# Patient Record
Sex: MALE | Race: WHITE | Employment: FULL TIME | URBAN - METROPOLITAN AREA
[De-identification: names, ages, dates, MRNs, and addresses within clinical notes are randomized per-mention and may not be internally consistent; named-entity substitution may affect disease eponyms.]

---

## 2021-06-10 ENCOUNTER — ANCILLARY PROCEDURE (OUTPATIENT)
Dept: GENERAL RADIOLOGY | Facility: CLINIC | Age: 26
End: 2021-06-10
Attending: NURSE PRACTITIONER
Payer: COMMERCIAL

## 2021-06-10 ENCOUNTER — OFFICE VISIT (OUTPATIENT)
Dept: URGENT CARE | Facility: URGENT CARE | Age: 26
End: 2021-06-10
Payer: COMMERCIAL

## 2021-06-10 ENCOUNTER — NURSE TRIAGE (OUTPATIENT)
Dept: NURSING | Facility: CLINIC | Age: 26
End: 2021-06-10

## 2021-06-10 VITALS
TEMPERATURE: 98.7 F | RESPIRATION RATE: 16 BRPM | DIASTOLIC BLOOD PRESSURE: 84 MMHG | WEIGHT: 224.2 LBS | OXYGEN SATURATION: 97 % | SYSTOLIC BLOOD PRESSURE: 144 MMHG | HEART RATE: 86 BPM

## 2021-06-10 DIAGNOSIS — R10.31 RLQ ABDOMINAL PAIN: Primary | ICD-10-CM

## 2021-06-10 DIAGNOSIS — R03.0 ELEVATED BLOOD PRESSURE READING WITHOUT DIAGNOSIS OF HYPERTENSION: ICD-10-CM

## 2021-06-10 DIAGNOSIS — R10.11 RUQ ABDOMINAL PAIN: ICD-10-CM

## 2021-06-10 LAB
ALBUMIN UR-MCNC: NEGATIVE MG/DL
APPEARANCE UR: CLEAR
BACTERIA #/AREA URNS HPF: ABNORMAL /HPF
BASOPHILS # BLD AUTO: 0 10E9/L (ref 0–0.2)
BASOPHILS NFR BLD AUTO: 0.4 %
BILIRUB UR QL STRIP: NEGATIVE
COLOR UR AUTO: YELLOW
DIFFERENTIAL METHOD BLD: NORMAL
EOSINOPHIL # BLD AUTO: 0.1 10E9/L (ref 0–0.7)
EOSINOPHIL NFR BLD AUTO: 1.7 %
ERYTHROCYTE [DISTWIDTH] IN BLOOD BY AUTOMATED COUNT: 11.1 % (ref 10–15)
GLUCOSE UR STRIP-MCNC: NEGATIVE MG/DL
HCT VFR BLD AUTO: 49.1 % (ref 40–53)
HGB BLD-MCNC: 16.6 G/DL (ref 13.3–17.7)
HGB UR QL STRIP: ABNORMAL
KETONES UR STRIP-MCNC: NEGATIVE MG/DL
LEUKOCYTE ESTERASE UR QL STRIP: NEGATIVE
LYMPHOCYTES # BLD AUTO: 2.6 10E9/L (ref 0.8–5.3)
LYMPHOCYTES NFR BLD AUTO: 31.2 %
MCH RBC QN AUTO: 30.3 PG (ref 26.5–33)
MCHC RBC AUTO-ENTMCNC: 33.8 G/DL (ref 31.5–36.5)
MCV RBC AUTO: 90 FL (ref 78–100)
MONOCYTES # BLD AUTO: 1.1 10E9/L (ref 0–1.3)
MONOCYTES NFR BLD AUTO: 13.5 %
NEUTROPHILS # BLD AUTO: 4.4 10E9/L (ref 1.6–8.3)
NEUTROPHILS NFR BLD AUTO: 53.2 %
NITRATE UR QL: NEGATIVE
PH UR STRIP: 6 PH (ref 5–7)
PLATELET # BLD AUTO: 158 10E9/L (ref 150–450)
RBC # BLD AUTO: 5.47 10E12/L (ref 4.4–5.9)
RBC #/AREA URNS AUTO: ABNORMAL /HPF
SOURCE: ABNORMAL
SP GR UR STRIP: 1.02 (ref 1–1.03)
UROBILINOGEN UR STRIP-ACNC: 0.2 EU/DL (ref 0.2–1)
WBC # BLD AUTO: 8.3 10E9/L (ref 4–11)
WBC #/AREA URNS AUTO: ABNORMAL /HPF

## 2021-06-10 PROCEDURE — 99204 OFFICE O/P NEW MOD 45 MIN: CPT | Performed by: NURSE PRACTITIONER

## 2021-06-10 PROCEDURE — 81001 URINALYSIS AUTO W/SCOPE: CPT | Performed by: NURSE PRACTITIONER

## 2021-06-10 PROCEDURE — 74019 RADEX ABDOMEN 2 VIEWS: CPT | Performed by: RADIOLOGY

## 2021-06-10 PROCEDURE — 85025 COMPLETE CBC W/AUTO DIFF WBC: CPT | Performed by: NURSE PRACTITIONER

## 2021-06-10 PROCEDURE — 36415 COLL VENOUS BLD VENIPUNCTURE: CPT | Performed by: NURSE PRACTITIONER

## 2021-06-10 SDOH — HEALTH STABILITY: MENTAL HEALTH: HOW OFTEN DO YOU HAVE 6 OR MORE DRINKS ON ONE OCCASION?: NOT ASKED

## 2021-06-10 SDOH — HEALTH STABILITY: MENTAL HEALTH: HOW OFTEN DO YOU HAVE A DRINK CONTAINING ALCOHOL?: NOT ASKED

## 2021-06-10 SDOH — HEALTH STABILITY: MENTAL HEALTH: HOW MANY STANDARD DRINKS CONTAINING ALCOHOL DO YOU HAVE ON A TYPICAL DAY?: NOT ASKED

## 2021-06-10 NOTE — TELEPHONE ENCOUNTER
"S-(situation): patient has had lower right abdominal pain since Monday. He is also having really soft green stools. Noting the color bleeds out into the water. Notes stomach is bothering him.    B-(background): no surgical history    A-(assessment): see below    R-(recommendations): Urgent Care for assessment per protocol.    Roselia Mendez RN, [facility] Triage      Additional Information    Negative: Passed out (i.e., fainted, collapsed and was not responding)    Negative: Shock suspected (e.g., cold/pale/clammy skin, too weak to stand, low BP, rapid pulse)    Negative: Sounds like a life-threatening emergency to the triager    Negative: Chest pain    Negative: Pain is mainly in upper abdomen (if needed ask: 'is it mainly above the belly button?')    Negative: SEVERE abdominal pain (e.g., excruciating)    Negative: Vomiting red blood or black (coffee ground) material    Negative: Bloody, black, or tarry bowel movements (Exception: chronic-unchanged black-grey bowel movements and is taking iron pills or Pepto-bismol)    Negative: Unable to urinate (or only a few drops) and bladder feels very full    Negative: Pain in scrotum persists > 1 hour    Answer Assessment - Initial Assessment Questions  1. LOCATION: \"Where does it hurt?\"       Lower right side  2. RADIATION: \"Does the pain shoot anywhere else?\" (e.g., chest, back)      no  3. ONSET: \"When did the pain begin?\" (Minutes, hours or days ago)       Monday evening  4. SUDDEN: \"Gradual or sudden onset?\"      sudden  5. PATTERN \"Does the pain come and go, or is it constant?\"     - If constant: \"Is it getting better, staying the same, or worsening?\"       (Note: Constant means the pain never goes away completely; most serious pain is constant and it progresses)      - If intermittent: \"How long does it last?\" \"Do you have pain now?\"      (Note: Intermittent means the pain goes away completely between bouts)      constant  6. SEVERITY: " "\"How bad is the pain?\"  (e.g., Scale 1-10; mild, moderate, or severe)     - MILD (1-3): doesn't interfere with normal activities, abdomen soft and not tender to touch      - MODERATE (4-7): interferes with normal activities or awakens from sleep, tender to touch      - SEVERE (8-10): excruciating pain, doubled over, unable to do any normal activities        2  7. RECURRENT SYMPTOM: \"Have you ever had this type of abdominal pain before?\" If so, ask: \"When was the last time?\" and \"What happened that time?\"       no  8. CAUSE: \"What do you think is causing the abdominal pain?\"      no  9. RELIEVING/AGGRAVATING FACTORS: \"What makes it better or worse?\" (e.g., movement, antacids, bowel movement)      no  10. OTHER SYMPTOMS: \"Has there been any vomiting, diarrhea, constipation, or urine problems?\"        Diarrhea, soft stools    Roselia Mendez RN, [facility] Triage    Protocols used: ABDOMINAL PAIN - MALE-A-OH      "

## 2021-06-10 NOTE — PATIENT INSTRUCTIONS
Patient Education     * Abdominal Pain, Unknown Cause [Male]  Based on your visit today, the exact cause of your abdominal stomach pain is not clear. Your exam and tests do not indicate a dangerous cause at this time. However, the signs of a serious problem may take more time to appear. Although your exam was reassuring today, sometimes early in the course of many conditions, exam and lab tests can appear normal. Therefore, it is important for you to watch for any new symptoms or worsening of your condition.  Causes:  It may not be obvious what caused your symptoms. Pay attention to things that do seem to make your symptoms worse or better and discuss this with your doctor when you follow up.  Diagnosis:  The evaluation of abdominal pain in the emergency department may only require an exam by the doctor or it may include blood, urine or imaging studies, depending on many factors. Sometimes exams and tests can identify a cause but in many cases, a clear cause is not found. Further testing at follow up visits may help to suggest a clear diagnosis.  Home Care:    Rest as much as possible until your next exam.    Try to avoid any medications (unless otherwise directed by your doctor), foods, activities, or other factors that you may have contributed to your symptoms.    Try to eat foods that you know that you have tolerated well in the past. Certain diets may be recommended for some conditions that cause abdominal pain. However, since the cause of your symptoms may not be clear, discuss your diet more with your primary care provider or specialist for further recommendations.     Eating several small meals per day as opposed to 2 or 3 larger meals may help.    Monitor closely for anything that may make your symptoms worse or better. Pay close attention to symptoms below that may indicate worsening of your condition.  Follow Up And Precautions:  See your doctor or this facility as instructed (or sooner if your symptoms  are not improving). In some cases, you may need more testing.  Contact Your Doctor Or Seek Medical Attention  if any of the following occur:    Pain is becoming worse    You are unable to take your medications because of too much vomiting.    Swelling of the abdomen    Fever of 101 F (38.3 C) or higher, or as directed by your health care provider    Blood in vomit or bowel movements (dark red or black color)    Jaundice (yellow color of eyes and skin)    New onset of weakness, dizziness or fainting    New onset of chest, arm, back, neck or jaw pain  For informational purposes only. Not to replace the advice of your health care provider.  Copyright   2018 [facility]. All rights reserved.

## 2021-06-10 NOTE — PROGRESS NOTES
Assessment & Plan     RLQ abdominal pain    - CBC with platelets and differential  - UA with Microscopic reflex to Culture  - XR Abdomen 2 Views    RUQ abdominal pain    - XR Abdomen 2 Views    Elevated blood pressure reading without diagnosis of hypertension       Reviewed normal CBC and UA during visit. Reviewed abdominal xray images and results not showing obvious cause for symptoms. Differentials include gall bladder disorder, appendicitis, UTI, pyelonephritis. Recommended avoiding alcohol and fatty foods. No sign of obvious infection at this time. Discussed option of referral to GI as he may need CT or ultrasound not emergently, but patient has appointment with PCP for next week and will follow-up at that time for ongoing abdominal concerns and elevated blood pressure.    Patient is hypertensive today but asymptomatic.  No headache, blurring of vision, chest pain, shortness of breath, dizziness, numbness, or weakness. Second BP reading was also above goal.  Patient instructed to follow up with PCP within the next week for BP recheck.  Instructed to go to emergency department if develops chest pain, shortness of breath, dizziness, vision changes, numbness, weakness.     Follow-up with PCP if symptoms persist for 7 days, and sooner if symptoms worsen or new symptoms develop.     Discussed red flag symptoms which warrant immediate visit in emergency room    All questions were answered and patient verbalized understanding. AVS reviewed with patient.     María Hannah, SILVIA, APRN, CNP 6/10/2021 3:53 PM  [facility]            Dinora Daly is a 26 year old male who presents to clinic today for the following health issues:  Chief Complaint   Patient presents with     GI Problem     having digestion issues, a little pain in stomach, and green stools since monday      Abdominal Pain    Location: RLQ   Radiation: back.    Pain character: sharp  Severity: 2 on a scale of 1-10.   "  Duration: 3 day(s)   Course of Illness: worsening.  Exacerbated by: nothing in particular   Relieved by: nothing.  Associated Symptoms: green soft stools, nausea   Surgical History: none  For the past few months he has been having \"stomach issues\" with mild abdominal pain and nausea which seem to be worsening.   Denies fever, chills, dysuria, hematuria, emesis, urinary frequency, urgency, bloody or black stool, diarrhea, constipation. No history of kidney stones, kidney infection, UTI.   No recent antibiotic use or recent travel. Stools are soft, not liquid. He has a couple smaller stools daily, last this morning. Milk worsens his symptoms and he has not had milk for years. He is able to tolerate cheese and yogurt. He drinks a couple beers nightly.    Problem list, Medication list, Allergies, and Medical history reviewed in EPIC.    ROS:  Review of systems negative except for noted above        Objective    BP (!) 144/84   Pulse 86   Temp 98.7  F (37.1  C) (Tympanic)   Resp 16   Wt 101.7 kg (224 lb 3.2 oz)   SpO2 97%   Physical Exam  Constitutional:       General: He is not in acute distress.     Appearance: He is not toxic-appearing or diaphoretic.   Abdominal:      General: Bowel sounds are normal.      Palpations: Abdomen is soft.      Tenderness: There is abdominal tenderness in the right upper quadrant and right lower quadrant. There is right CVA tenderness. There is no left CVA tenderness, guarding or rebound.   Lymphadenopathy:      Cervical: No cervical adenopathy.       X-ray abdomen was performed and reviewed independently by myself showing no obvious bowel obstruction   Labs and Radiologist impression:   Results for orders placed or performed in visit on 06/10/21   XR Abdomen 2 Views     Status: None    Narrative    ABDOMEN TWO VIEWS  6/10/2021 2:08 PM    HISTORY: 26-year-old patient with right lower quadrant abdominal pain.  Green stool.    COMPARISON: None      Impression    IMPRESSION: No " intraperitoneal air. No dilated air-filled loops of  small bowel. Mild amount of stool scattered throughout the colon.  Overall, nonspecific bowel gas pattern.    CHAUNCEY NIÑO MD   CBC with platelets and differential     Status: None   Result Value Ref Range    WBC 8.3 4.0 - 11.0 10e9/L    RBC Count 5.47 4.4 - 5.9 10e12/L    Hemoglobin 16.6 13.3 - 17.7 g/dL    Hematocrit 49.1 40.0 - 53.0 %    MCV 90 78 - 100 fl    MCH 30.3 26.5 - 33.0 pg    MCHC 33.8 31.5 - 36.5 g/dL    RDW 11.1 10.0 - 15.0 %    Platelet Count 158 150 - 450 10e9/L    % Neutrophils 53.2 %    % Lymphocytes 31.2 %    % Monocytes 13.5 %    % Eosinophils 1.7 %    % Basophils 0.4 %    Absolute Neutrophil 4.4 1.6 - 8.3 10e9/L    Absolute Lymphocytes 2.6 0.8 - 5.3 10e9/L    Absolute Monocytes 1.1 0.0 - 1.3 10e9/L    Absolute Eosinophils 0.1 0.0 - 0.7 10e9/L    Absolute Basophils 0.0 0.0 - 0.2 10e9/L    Diff Method Automated Method    UA with Microscopic reflex to Culture     Status: Abnormal    Specimen: Midstream Urine   Result Value Ref Range    Color Urine Yellow     Appearance Urine Clear     Glucose Urine Negative NEG^Negative mg/dL    Bilirubin Urine Negative NEG^Negative    Ketones Urine Negative NEG^Negative mg/dL    Specific Gravity Urine 1.020 1.003 - 1.035    pH Urine 6.0 5.0 - 7.0 pH    Protein Albumin Urine Negative NEG^Negative mg/dL    Urobilinogen Urine 0.2 0.2 - 1.0 EU/dL    Nitrite Urine Negative NEG^Negative    Blood Urine Trace (A) NEG^Negative    Leukocyte Esterase Urine Negative NEG^Negative    Source Midstream Urine     WBC Urine 0 - 5 OTO5^0 - 5 /HPF    RBC Urine O - 2 OTO2^O - 2 /HPF    Bacteria Urine Few (A) NEG^Negative /HPF

## 2021-06-15 NOTE — PROGRESS NOTES
SUBJECTIVE:   CC: Addy Moreland is an 26 year old male who presents for preventative health visit.       Patient has been advised of split billing requirements and indicates understanding: Yes  Healthy Habits:     Getting at least 3 servings of Calcium per day:  NO    Bi-annual eye exam:  NO    Dental care twice a year:  Yes    Sleep apnea or symptoms of sleep apnea:  Daytime drowsiness    Diet:  Other    Frequency of exercise:  2-3 days/week    Duration of exercise:  Greater than 60 minutes    Taking medications regularly:  Yes    Medication side effects:  None    PHQ-2 Total Score: 2    Additional concerns today:  No              Today's PHQ-2 Score:   PHQ-2 ( 1999 Pfizer) 6/16/2021   Q1: Little interest or pleasure in doing things 1   Q2: Feeling down, depressed or hopeless 1   PHQ-2 Score 2   Q1: Little interest or pleasure in doing things Several days   Q2: Feeling down, depressed or hopeless Several days   PHQ-2 Score 2       Abuse: Current or Past(Physical, Sexual or Emotional)- No  Do you feel safe in your environment? Yes    Have you ever done Advance Care Planning? (For example, a Health Directive, POLST, or a discussion with a medical provider or your loved ones about your wishes): No, advance care planning information given to patient to review.  Patient plans to discuss their wishes with loved ones or provider.      Social History     Tobacco Use     Smoking status: Never Smoker     Smokeless tobacco: Never Used   Substance Use Topics     Alcohol use: Yes     If you drink alcohol do you typically have >3 drinks per day or >7 drinks per week? No    Alcohol Use 6/16/2021   Prescreen: >3 drinks/day or >7 drinks/week? Yes   Prescreen: >3 drinks/day or >7 drinks/week? -   AUDIT SCORE  5       Last PSA: No results found for: PSA    Reviewed orders with patient. Reviewed health maintenance and updated orders accordingly - Yes  Lab work is in process    Reviewed and updated as needed this visit by clinical  "staff  Tobacco  Allergies  Meds   Med Hx  Surg Hx  Fam Hx  Soc Hx        Reviewed and updated as needed this visit by Provider                History reviewed. No pertinent past medical history.     Review of Systems   Constitutional: Negative for chills and fever.   HENT: Negative for congestion, ear pain, hearing loss and sore throat.    Eyes: Positive for visual disturbance. Negative for pain.   Respiratory: Positive for shortness of breath. Negative for cough.    Cardiovascular: Positive for chest pain. Negative for palpitations and peripheral edema.   Gastrointestinal: Positive for abdominal pain, diarrhea, heartburn, hematochezia and nausea. Negative for constipation.   Genitourinary: Negative for discharge, dysuria, frequency, genital sores, hematuria, impotence and urgency.   Musculoskeletal: Positive for arthralgias. Negative for joint swelling and myalgias.   Skin: Negative for rash.   Neurological: Positive for headaches. Negative for dizziness, weakness and paresthesias.   Psychiatric/Behavioral: Negative for mood changes. The patient is nervous/anxious.          OBJECTIVE:   BP (!) 150/70   Pulse 85   Temp 98.2  F (36.8  C) (Oral)   Resp 15   Ht 1.791 m (5' 10.5\")   Wt 101.2 kg (223 lb 3.2 oz)   SpO2 99%   BMI 31.57 kg/m      Physical Exam  GENERAL: healthy, alert and no distress  EYES: Eyes grossly normal to inspection, PERRL and conjunctivae and sclerae normal  HENT: ear canals and TM's normal, nose and mouth without ulcers or lesions  HENT: Dandruff in the ears  NECK: no adenopathy, no asymmetry, masses, or scars and thyroid normal to palpation  RESP: lungs clear to auscultation - no rales, rhonchi or wheezes  CV: regular rate and rhythm, normal S1 S2, no S3 or S4, no murmur, click or rub, no peripheral edema and peripheral pulses strong  ABDOMEN: soft, nontender, no hepatosplenomegaly, no masses and bowel sounds normal  MS: no gross musculoskeletal defects noted, no edema  SKIN: " Pilonidal sinus noted in the back  NEURO: Normal strength and tone, mentation intact and speech normal  PSYCH: mentation appears normal, affect normal/bright        ASSESSMENT/PLAN:   1. Routine general medical examination at a health care facility    - Lipid panel reflex to direct LDL Non-fasting  - Basic metabolic panel  (Ca, Cl, CO2, Creat, Gluc, K, Na, BUN)  - TDAP VACCINE (Adacel, Boostrix)  [[id number]]    2. Encounter for screening for HIV    - HIV Antigen Antibody Combo    3. Encounter for hepatitis C screening test for low risk patient    - **Hepatitis C Screen Reflex to RNA FUTURE anytime    4. Pilonidal sinus  He has longstanding pilonidal sinus  Currently not actively inflamed or infected  He is going to look into getting this operated at some point  - OFFICE/OUTPT VISIT,COTY MENA IV    5. Elevated blood pressure reading without diagnosis of hypertension  We discussed at length about elevated blood pressure  He is going to keep a home recording and get in touch with me in a month  If still elevated then we will consider treatment  Also discussed about low-salt diet and DASH diet  - OFFICE/OUTPT VISIT,COTY MENA IV    6. Hematochezia  Painless bleeding for the last few months  Happens at the end of the defecation  Brother being investigated for possible Crohn's  - GASTROENTEROLOGY ADULT REF PROCEDURE ONLY; Future  - OFFICE/OUTPT VISIT,COTY MENA IV    7. Encounter for laboratory testing for COVID-19 virus  He has not contacted vaccinated and would like to first low if he is immune to Covid  Will test antibodies  - COVID-19 Ike RBD [facility] [employer] Reflex    Patient has been advised of split billing requirements and indicates understanding: Yes  COUNSELING:   Reviewed preventive health counseling, as reflected in patient instructions       Regular exercise       Healthy diet/nutrition       Vision screening       Immunizations    Vaccinated for: TDAP             Consider Hep C screening for all patients one time for  "ages 18-79 years       HIV screeninx in teen years, 1x in adult years, and at intervals if high risk    Estimated body mass index is 31.57 kg/m  as calculated from the following:    Height as of this encounter: 1.791 m (5' 10.5\").    Weight as of this encounter: 101.2 kg (223 lb 3.2 oz).     Weight management plan: Discussed healthy diet and exercise guidelines    He reports that he has never smoked. He has never used smokeless tobacco.    Results of blood work discussed with patient  He has elevated LDL  LDL is more than 200  Discussed about therapeutic lifestyle changes  We need to repeat the cholesterol again in 6 months  He is going to work on diet and exercise  Counseling Resources:  ATP IV Guidelines  Pooled Cohorts Equation Calculator  FRAX Risk Assessment  ICSI Preventive Guidelines  Dietary Guidelines for Americans, 2010  USDA's MyPlate  ASA Prophylaxis  Lung CA Screening    Neeraj Olguin MD  [facility]  "

## 2021-06-15 NOTE — PATIENT INSTRUCTIONS
Preventive Health Recommendations  Male Ages 26 - 39    Yearly exam:             See your health care provider every year in order to  o   Review health changes.   o   Discuss preventive care.    o   Review your medicines if your doctor has prescribed any.    You should be tested each year for STDs (sexually transmitted diseases), if you re at risk.     After age 35, talk to your provider about cholesterol testing. If you are at risk for heart disease, have your cholesterol tested at least every 5 years.     If you are at risk for diabetes, you should have a diabetes test (fasting glucose).  Shots: Get a flu shot each year. Get a tetanus shot every 10 years.     Nutrition:    Eat at least 5 servings of fruits and vegetables daily.     Eat whole-grain bread, whole-wheat pasta and brown rice instead of white grains and rice.     Get adequate Calcium and Vitamin D.     Lifestyle    Exercise for at least 150 minutes a week (30 minutes a day, 5 days a week). This will help you control your weight and prevent disease.     Limit alcohol to one drink per day.     No smoking.     Wear sunscreen to prevent skin cancer.     See your dentist every six months for an exam and cleaning.     Patient Education     Adult Immunization Schedule  Vaccine  How often  Disease prevented  Who needs it    Influenza Every year Flu. This can be especially dangerous to older adults or people with immune disorders.  All adults   Tetanus, diphtheria (Td); or Tetanus, diphtheria, and pertussis (Tdap)*  One dose of Tdap, then one dose of Td as a booster every 10 years  Tetanus (lockjaw), a disease that causes muscles to spasm   Diphtheria, an infection that causes fever, weakness, and breathing problems   Pertussis, also known as whooping cough. This is a highly contagious disease that can cause serious illness.  All adults  *This vaccine should be given during each pregnancy, no matter how many years since the last vaccine. The vaccine increases  protection for your . A  is too young to get the vaccine. But newborns have the highest risk for severe illness and death from pertussis.    Varicella (Anibal)** One series of 2 injections  Chickenpox. This is a disease that causes itchy skin bumps, fever, and tiredness. It can lead to scarring, pneumonia, or brain inflammation.  Adults who don t have evidence of immunity   **This vaccine should not be given to pregnant women. Women should avoid pregnancy for 4 weeks after the vaccine.    Human papillomavirus (HPV)  2 to 3 doses depending on age at first dose or condition        Cervical cancer, caused by some types of HPV    Vaginal and vulvar cancer    Penile cancer    Head and neck cancers    Anal cancer    Genital warts Teens and young adults who start the series at ages 15 to 26 need 3 doses of vaccine. This includes:         Young women through age 26 and young men through age 21    Young adults through age 26 with certain conditions that suppress the immune system    Young men who have sex with men, including young men who identify as rivera or bisexual or who intend to have sex with men through age 26    Transgender young adults through age 26      HPV vaccine may be given between ages 27 to 45, depending on risk. Ask your healthcare provider if this vaccine is right for you.    Pneumococcal (PCV13)  1 dose at recommended times depending on age and condition  Pneumonia. This is an infection that causes inflammation in your lungs. It can lead to death.        Adults ages 19 to 64 with weak immune systems or chronic diseases such as chronic kidney failure, lung, heart, or liver disease, certain cancers, and alcoholism. Also recommended for adults with a cerebrospinal fluid (CSF) leak or cochlear implants, and those who smoke cigarettes.    Healthy adults ages 65 and older: Talk with your healthcare provider to decide if you need PCV13. PPSV23 is recommended for all adults 65 years and older.        Pneumococcal (PPSV23)   1 or 2 doses depending on age and condition  Pneumonia. This is an infection that causes inflammation in your lungs. It can lead to death.        All adults ages 65 and older.    Adults ages 19 to 64 with chronic illnesses, such as asthma, COPD, heart disease, diabetes, liver disease, alcoholism, sickle cell disease, or history of splenectomy. Also, adults with an immune disorder and those who smoke cigarettes.       Vaccine How often Disease prevented Who needs it   Recombinant zoster vaccine (RZV) 2 doses, the second dose given 2 to 6 months after the first  Herpes zoster (shingles), a painful rash marked by blisters  Adults ages 50 and older. This is given even if you've had shingles before or had a previous zoster live vaccine (ZVL).    Measles, mumps, rubella (MMR)**  1 or 2 doses, for ages 19 through 49; 1 dose for ages 50 and older if at risk  Measles, a disease marked by red spots, fever, and coughing   Mumps, a disease that causes swelling in the salivary glands. It may affect the ovaries or testes.   Rubella (Hungarian measles). This is a form of measles that can cause birth defects if a pregnant woman catches it.  Adults born in 1957 or later who are not known to be immune to all 3 of these diseases. Ask your healthcare provider if you need a second dose.   **This vaccine should not be given to pregnant women. Women should avoid pregnancy for 4 weeks after vaccination.    Meningococcal  Two types of vaccines are available depending on age and medical situation:         Meningococcal conjugate (MenACWY)    Serogroup B vaccines (MenB)     1 or more doses depending on vaccine type and condition  Meningococcal disease (bacterial meningitis). This is inflammation of the membrane covering the brain and spinal cord. It can lead to death.  Adults with immune deficiencies or at high risk of exposure. Also, college freshmen living in dormitories and  recruits.   Ask your healthcare  provider about meningococcal disease and which vaccine may be right for you.    Hepatitis A (HepA) One series of 2 injections  Hepatitis A. This is an infection that can result in acute liver inflammation and yellow skin and whites of the eyes (jaundice).  Adults with risk factors, such as clotting disorders or chronic liver disease, and adults with high risk of exposure. This includes men who have sex with men, IV (intravenous) drug users, and travelers to countries where hepatitis A is common.    Hepatitis B (HepB) One series of 3 injections  Hepatitis B. This is an infection that causes chronic, severe liver disease.  Adults with high risk of exposure, such as healthcare providers and sanitation workers, and adults with diabetes    Travelers  diseases As needed Infections such as cholera, typhoid, yellow fever, polio, rabies, meningococcal disease, hepatitis A, hepatitis B  Adults traveling out of the country. Required vaccines will vary, depending on the country you visit. Check the CDC website: www.cdc.gov.    Based on the CDC National Immunization Program recommendations for adults   Lori last reviewed this educational content on 6/1/2019 2000-2021 [employer]. All rights reserved. This information is not intended as a substitute for professional medical care. Always follow your healthcare professional's instructions.

## 2021-06-16 ENCOUNTER — OFFICE VISIT (OUTPATIENT)
Dept: FAMILY MEDICINE | Facility: CLINIC | Age: 26
End: 2021-06-16
Payer: COMMERCIAL

## 2021-06-16 VITALS
HEIGHT: 71 IN | RESPIRATION RATE: 15 BRPM | TEMPERATURE: 98.2 F | DIASTOLIC BLOOD PRESSURE: 70 MMHG | WEIGHT: 223.2 LBS | OXYGEN SATURATION: 99 % | HEART RATE: 85 BPM | SYSTOLIC BLOOD PRESSURE: 150 MMHG | BODY MASS INDEX: 31.25 KG/M2

## 2021-06-16 DIAGNOSIS — Z11.59 ENCOUNTER FOR HEPATITIS C SCREENING TEST FOR LOW RISK PATIENT: ICD-10-CM

## 2021-06-16 DIAGNOSIS — Z00.00 ROUTINE GENERAL MEDICAL EXAMINATION AT A HEALTH CARE FACILITY: Primary | ICD-10-CM

## 2021-06-16 DIAGNOSIS — K92.1 HEMATOCHEZIA: ICD-10-CM

## 2021-06-16 DIAGNOSIS — E78.5 DYSLIPIDEMIA: ICD-10-CM

## 2021-06-16 DIAGNOSIS — Z11.4 ENCOUNTER FOR SCREENING FOR HIV: ICD-10-CM

## 2021-06-16 DIAGNOSIS — Z20.822 ENCOUNTER FOR LABORATORY TESTING FOR COVID-19 VIRUS: ICD-10-CM

## 2021-06-16 DIAGNOSIS — R03.0 ELEVATED BLOOD PRESSURE READING WITHOUT DIAGNOSIS OF HYPERTENSION: ICD-10-CM

## 2021-06-16 DIAGNOSIS — L05.92 PILONIDAL SINUS: ICD-10-CM

## 2021-06-16 PROCEDURE — 86769 SARS-COV-2 COVID-19 ANTIBODY: CPT | Mod: 59 | Performed by: INTERNAL MEDICINE

## 2021-06-16 PROCEDURE — 99395 PREV VISIT EST AGE 18-39: CPT | Mod: 25 | Performed by: INTERNAL MEDICINE

## 2021-06-16 PROCEDURE — 80048 BASIC METABOLIC PNL TOTAL CA: CPT | Performed by: INTERNAL MEDICINE

## 2021-06-16 PROCEDURE — 87389 HIV-1 AG W/HIV-1&-2 AB AG IA: CPT | Performed by: INTERNAL MEDICINE

## 2021-06-16 PROCEDURE — 99213 OFFICE O/P EST LOW 20 MIN: CPT | Mod: 25 | Performed by: INTERNAL MEDICINE

## 2021-06-16 PROCEDURE — 90471 IMMUNIZATION ADMIN: CPT | Performed by: INTERNAL MEDICINE

## 2021-06-16 PROCEDURE — 86803 HEPATITIS C AB TEST: CPT | Performed by: INTERNAL MEDICINE

## 2021-06-16 PROCEDURE — 36415 COLL VENOUS BLD VENIPUNCTURE: CPT | Performed by: INTERNAL MEDICINE

## 2021-06-16 PROCEDURE — 90715 TDAP VACCINE 7 YRS/> IM: CPT | Performed by: INTERNAL MEDICINE

## 2021-06-16 PROCEDURE — 80061 LIPID PANEL: CPT | Performed by: INTERNAL MEDICINE

## 2021-06-16 PROCEDURE — 86769 SARS-COV-2 COVID-19 ANTIBODY: CPT | Performed by: INTERNAL MEDICINE

## 2021-06-16 ASSESSMENT — ENCOUNTER SYMPTOMS
ARTHRALGIAS: 1
DYSURIA: 0
CONSTIPATION: 0
NERVOUS/ANXIOUS: 1
ABDOMINAL PAIN: 1
FREQUENCY: 0
EYE PAIN: 0
DIARRHEA: 1
WEAKNESS: 0
HEMATURIA: 0
MYALGIAS: 0
FEVER: 0
HEMATOCHEZIA: 1
JOINT SWELLING: 0
COUGH: 0
CHILLS: 0
PARESTHESIAS: 0
HEARTBURN: 1
HEADACHES: 1
PALPITATIONS: 0
DIZZINESS: 0
SORE THROAT: 0
NAUSEA: 1
SHORTNESS OF BREATH: 1

## 2021-06-16 ASSESSMENT — MIFFLIN-ST. JEOR: SCORE: 2006.62

## 2021-06-16 NOTE — LETTER
June 18, 2021      Addy Moreland  [address]  [place] [facility] [place] [address]        Dear ,      Your Covid antibody test is positive   This means that you either had the infection or had vaccination   Since you did not have the Covid vaccine it clearly means that you had Covid infection at some time     Please contact the clinic if you have additional questions.  Thank you.     Sincerely,       Neeraj Olguin MD       Resulted Orders   Lipid panel reflex to direct LDL Non-fasting   Result Value Ref Range    Cholesterol 261 (H) <200 mg/dL      Comment:      Desirable:       <200 mg/dl    Triglycerides 83 <150 mg/dL      Comment:      Non Fasting    HDL Cholesterol 43 >39 mg/dL    LDL Cholesterol Calculated 201 (H) <100 mg/dL      Comment:      Above desirable:  100-129 mg/dl  Borderline High:  130-159 mg/dL  High:             160-189 mg/dL  Very high:       >189 mg/dl      Non HDL Cholesterol 218 (H) <130 mg/dL      Comment:      Above Desirable:  130-159 mg/dl  Borderline high:  160-189 mg/dl  High:             190-219 mg/dl  Very high:       >219 mg/dl     Basic metabolic panel  (Ca, Cl, CO2, Creat, Gluc, K, Na, BUN)   Result Value Ref Range    Sodium 135 133 - 144 mmol/L    Potassium 4.1 3.4 - 5.3 mmol/L    Chloride 102 94 - 109 mmol/L    Carbon Dioxide 29 20 - 32 mmol/L    Anion Gap 4 3 - 14 mmol/L    Glucose 77 70 - 99 mg/dL      Comment:      Non Fasting    Urea Nitrogen 15 7 - 30 mg/dL    Creatinine 0.79 0.66 - 1.25 mg/dL    GFR Estimate >90 >60 mL/min/[1.73_m2]      Comment:      Non  GFR Calc  Starting 12/18/2018, serum creatinine based estimated GFR (eGFR) will be   calculated using the Chronic Kidney Disease Epidemiology Collaboration   (CKD-EPI) equation.      GFR Estimate If Black >90 >60 mL/min/[1.73_m2]      Comment:       GFR Calc  Starting 12/18/2018, serum creatinine based estimated GFR (eGFR) will be   calculated using the Chronic Kidney Disease Epidemiology  Collaboration   (CKD-EPI) equation.      Calcium 9.3 8.5 - 10.1 mg/dL   HIV Antigen Antibody Combo   Result Value Ref Range    HIV Antigen Antibody Combo Nonreactive NR^Nonreactive          Comment:      HIV-1 p24 Ag & HIV-1/HIV-2 Ab Not Detected   **Hepatitis C Screen Reflex to RNA FUTURE anytime   Result Value Ref Range    Hepatitis C Antibody Nonreactive NR^Nonreactive      Comment:      Assay performance characteristics have not been established for newborns,   infants, and children     COVID-19 Ike RBD Betsey & Titer Reflex   Result Value Ref Range    COVID-19 Ike RBD Betsey Positive       Comment:      Antibodies to COVID-19 detected, which may be due to COVID-19 vaccination, or   past or current COVID-19 infection.      COVID-19 Ike RBD Betsey Titer 1:800       Comment:      Qualitative screen for IgG, IgM and IgA antibodies to COVID-19 (SARS-CoV-2)   spike receptor binding domain (RBD) protein, with semi-quantitative   measurement of IgG COVID-19 spike RBD antibodies by endpoint titer. COVID-19   spike RBD antibodies may be elevated due to vaccination, or a past or current   COVID-19 infection.  The qualitative screen for IgG, IgM and IgA COVID-19 spike RBD antibodies is   performed on the [employer], and this test is approved by the FDA under the   Emergency Use Authorization (EUA).  The IgG titer test was developed and its performance characteristics   determined by the [facility], which is regulated under CLIA as qualified to perform   high-complexity testing. The IgG titer test has not been reviewed by the FDA.   Negative results do not rule out COVID-19 infection.  Results from antibody testing should not be used as the sole basis to diagnose   or exclude SARS-CoV-2 infection or to inform in fection status. COVID-19 PCR   test should be ordered if current infection is suspected. False positive   results may occur in rare cases due to  cross-reacting antibodies.  Testing performed by [facility], [facility], [address], [address], [place] [address]

## 2021-06-17 LAB
ANION GAP SERPL CALCULATED.3IONS-SCNC: 4 MMOL/L (ref 3–14)
BUN SERPL-MCNC: 15 MG/DL (ref 7–30)
CALCIUM SERPL-MCNC: 9.3 MG/DL (ref 8.5–10.1)
CHLORIDE SERPL-SCNC: 102 MMOL/L (ref 94–109)
CHOLEST SERPL-MCNC: 261 MG/DL
CO2 SERPL-SCNC: 29 MMOL/L (ref 20–32)
CREAT SERPL-MCNC: 0.79 MG/DL (ref 0.66–1.25)
GFR SERPL CREATININE-BSD FRML MDRD: >90 ML/MIN/{1.73_M2}
GLUCOSE SERPL-MCNC: 77 MG/DL (ref 70–99)
HCV AB SERPL QL IA: NONREACTIVE
HDLC SERPL-MCNC: 43 MG/DL
HIV 1+2 AB+HIV1 P24 AG SERPL QL IA: NONREACTIVE
LDLC SERPL CALC-MCNC: 201 MG/DL
NONHDLC SERPL-MCNC: 218 MG/DL
POTASSIUM SERPL-SCNC: 4.1 MMOL/L (ref 3.4–5.3)
SODIUM SERPL-SCNC: 135 MMOL/L (ref 133–144)
TRIGL SERPL-MCNC: 83 MG/DL

## 2021-06-18 LAB
SARS-COV-2 AB PNL SERPL IA: POSITIVE
SARS-COV-2 IGG SERPL IA-ACNC: NORMAL

## 2022-07-06 ENCOUNTER — TELEPHONE (OUTPATIENT)
Dept: FAMILY MEDICINE | Facility: CLINIC | Age: 27
End: 2022-07-06

## 2022-07-06 DIAGNOSIS — K92.1 HEMATOCHEZIA: Primary | ICD-10-CM

## 2022-07-06 NOTE — TELEPHONE ENCOUNTER
Pt came into clinic and informed  that he saw Dr. Olguin about a year ago and had a colonoscopy order placed for pt. PT stated that when he called to schedule his colonoscopy the order had . Pt would like for provider to send in a new order so that he can schedule his colonoscopy.   Please advise,  Thank you

## 2022-07-07 ENCOUNTER — TELEPHONE (OUTPATIENT)
Dept: GASTROENTEROLOGY | Facility: CLINIC | Age: 27
End: 2022-07-07

## 2022-07-07 NOTE — TELEPHONE ENCOUNTER
Screening Questions    BlueKIND OF PREP RedLOCATION [review exclusion criteria] GreenSEDATION TYPE      1. Are you active on mychart? y    2. What insurance is in the chart? BCBS     3.   Ordering/Referring Provider: Neeraj Olguin MD       4. BMI   (If greater than 40 review exclusion criteria [PAC APPT IF [MAC] @ [facility])  31.6  [If yes, BMI OVER 40-EXTENDED PREP]      **(Sedation review/consideration needed)**  Do you have a legal guardian or Medical Power of    and/or are you able to give consent for your medical care?     Can give consent    5. Have you had a positive covid test in the last 90 days?   n -     6.  Are you currently on dialysis?   n [ If yes, G-PREP & HOSPITAL setting ONLY]     7.  Do you have chronic kidney disease?  n [ If yes, G-PREP ]    8.   Do you have a diagnosis of diabetes?   n   [ If yes, G-PREP ]    9.  On a regular basis do you go 3-5 days between bowel movements?   n   [ If yes, EXTENDED PREP]    10.  Are you taking any prescription pain medications on a routine schedule?    n -  [ If yes, EXTENDED PREP] [If yes, MAC]      11.   Do you have any chemical dependencies such as alcohol, street drugs, or methadone?    n [If yes, MAC]    12.   Do you have any history of post-traumatic stress syndrome, severe anxiety or history of psychosis?    n  [If yes, MAC]    13.  [FEMALES] Are you currently pregnant? n/a    If yes, how many weeks?       Respiratory/Heart Screening:  [If yes to any of the following HOSPITAL setting only]     14. Do you have Pulmonary Hypertension [Lungs]?   n       15. Do you have UNCONTROLLED asthma?   n     16.  Do you use daily home oxygen?  n      17. Do you have mod to severe Obstructive Sleep Apnea?         (OKAY @ [facility]  [facility]  SH  PH  RI  MG - if pt is not on OXYGEN)  n      18.   Have you had a heart or lung transplant?   n      19.   Have you had a stroke or Transient ischemic attack (TIA - aka  mini stroke ) within 6 months?  (If yes, please review  exclusion criteria)  n     20.   In the past 6 months, have you had any heart related issues including cardiomyopathy or heart attack?   n           If yes, did it require cardiac stenting or other implantable device?         21.   Do you have any implantable devices in your body (pacemaker, defib, LVAD)? (If yes, please review exclusion criteria)  n     22. Do you take nitroglycerin?   n           If yes, how often?   (if yes, HOSPITAL setting ONLY)    23.  Are you currently taking any blood thinners?    [If yes, INFORM patient to follw up w/ ORDERING PROVIDER FOR BRIDGING INSTRUCTIONS]     n    24.   Do you transfer independently?                (If NO, please HOSPITAL setting ONLY)  y    25.   Preferred LOCAL Pharmacy for Pre Prescription:      [id number] - [place], [place] - [address]    Scheduling Details      Caller :  Addy   (Please ask for phone number if not scheduled by patient)    Type of Procedure Scheduled: Lower Endoscopy [Colonoscopy]  Which Colonoscopy Prep was Sent?: bebeto    Surgeon: Sandro  Date of Procedure: 7/14  Location: [facility]    Sedation Type: MODERATE    Conscious Sedation- Needs  for 6 hours after the procedure  MAC/General-Needs  for 24 hours after procedure    Pre-op Required at [facility], [place], [place] and OR for MAC sedation: n  (advise patient they will need a pre-op prior to procedure -)      Informed patient they will need an adult  y  Cannot take any type of public or medical transportation alone    Pre-Procedure Covid test to be completed at [facility] or Externally: home test    Confirmed Nurse will call to complete assessment y    Additional comments:

## 2022-07-07 NOTE — TELEPHONE ENCOUNTER
Spoke with pt, advised that a new referral for the colonoscopy has been placed by Dr. Olguin, provided scheduling number, also provided billing department's phone number. Pt ws inquiring about the cost for procedure.

## 2022-07-08 ENCOUNTER — TELEPHONE (OUTPATIENT)
Dept: GASTROENTEROLOGY | Facility: CLINIC | Age: 27
End: 2022-07-08

## 2022-07-08 NOTE — TELEPHONE ENCOUNTER
Attempted to contact patient regarding upcoming colonscopy procedure on 7.14.2022 for pre assessment questions. No answer.     Left message to return call to [phone] #3    Discuss at home rapid antigen COVID test 1-2 days prior to procedure.    Arrival time: 1345    Facility location: [facility]    Sedation type: CS    Indication for procedure: hematochezia    Bowel prep recommendation: Miralax/Magnesium citrate/Dulcolax    Lisa Almazan RN

## 2022-07-12 NOTE — TELEPHONE ENCOUNTER
Pre assessment questions completed for upcoming Colonoscopy procedure scheduled on 7/14/22    COVID policy reviewed. Patient to complete rapid antigen test one to two days before their scheduled procedure. Patient to bring photo of the results when they come in for their procedure.    Reviewed procedural arrival time 1:45pm and facility location [facility].    Designated  policy reviewed. Instructed to have someone stay 6 hours post procedure.     Procedure indication: See below    Bowel prep recommendation: Miralax    Prep instructions sent via [employer].    Reviewed Colonoscopy prep instructions with patient. No fiber/iron supplements or foods that contain nuts/seeds 7 days prior to procedure.     Anticoagulation/blood thinners? None    Electronic implanted devices? None    Patient verbalized understanding and had no questions or concerns at this time.    Kathy Robb RN

## 2022-07-12 NOTE — TELEPHONE ENCOUNTER
Attempt # 2 -- Will also send [employer] message     Attempted to contact patient regarding upcoming Colonoscopy procedure on 7/14/22 for pre assessment questions.  No answer.  Left message to return call to [phone] #2    Kathy Robb RN

## 2022-07-14 ENCOUNTER — ANESTHESIA EVENT (OUTPATIENT)
Dept: SURGERY | Facility: AMBULATORY SURGERY CENTER | Age: 27
End: 2022-07-14
Payer: COMMERCIAL

## 2022-07-14 ENCOUNTER — ANESTHESIA (OUTPATIENT)
Dept: SURGERY | Facility: AMBULATORY SURGERY CENTER | Age: 27
End: 2022-07-14
Payer: COMMERCIAL

## 2022-07-14 ENCOUNTER — HOSPITAL ENCOUNTER (OUTPATIENT)
Facility: AMBULATORY SURGERY CENTER | Age: 27
Discharge: HOME | End: 2022-07-14
Attending: STUDENT IN AN ORGANIZED HEALTH CARE EDUCATION/TRAINING PROGRAM
Payer: COMMERCIAL

## 2022-07-14 VITALS
RESPIRATION RATE: 14 BRPM | HEIGHT: 70 IN | WEIGHT: 225 LBS | DIASTOLIC BLOOD PRESSURE: 59 MMHG | SYSTOLIC BLOOD PRESSURE: 120 MMHG | OXYGEN SATURATION: 98 % | TEMPERATURE: 97 F | BODY MASS INDEX: 32.21 KG/M2 | HEART RATE: 67 BPM

## 2022-07-14 VITALS — HEART RATE: 86 BPM

## 2022-07-14 DIAGNOSIS — K62.5 RECTAL BLEEDING: Primary | ICD-10-CM

## 2022-07-14 LAB — COLONOSCOPY: NORMAL

## 2022-07-14 PROCEDURE — 45380 COLONOSCOPY AND BIOPSY: CPT

## 2022-07-14 PROCEDURE — 88305 TISSUE EXAM BY PATHOLOGIST: CPT | Mod: 26 | Performed by: PATHOLOGY

## 2022-07-14 PROCEDURE — 88305 TISSUE EXAM BY PATHOLOGIST: CPT | Mod: TC | Performed by: STUDENT IN AN ORGANIZED HEALTH CARE EDUCATION/TRAINING PROGRAM

## 2022-07-14 RX ORDER — PROPOFOL 10 MG/ML
INJECTION, EMULSION INTRAVENOUS PRN
Status: DISCONTINUED | OUTPATIENT
Start: 2022-07-14 | End: 2022-07-14

## 2022-07-14 RX ORDER — NALOXONE HYDROCHLORIDE 0.4 MG/ML
0.2 INJECTION, SOLUTION INTRAMUSCULAR; INTRAVENOUS; SUBCUTANEOUS
Status: DISCONTINUED | OUTPATIENT
Start: 2022-07-14 | End: 2022-07-15 | Stop reason: HOSPADM

## 2022-07-14 RX ORDER — ONDANSETRON 2 MG/ML
4 INJECTION INTRAMUSCULAR; INTRAVENOUS EVERY 6 HOURS PRN
Status: DISCONTINUED | OUTPATIENT
Start: 2022-07-14 | End: 2022-07-15 | Stop reason: HOSPADM

## 2022-07-14 RX ORDER — LIDOCAINE HYDROCHLORIDE 20 MG/ML
INJECTION, SOLUTION INFILTRATION; PERINEURAL PRN
Status: DISCONTINUED | OUTPATIENT
Start: 2022-07-14 | End: 2022-07-14

## 2022-07-14 RX ORDER — NALOXONE HYDROCHLORIDE 0.4 MG/ML
0.4 INJECTION, SOLUTION INTRAMUSCULAR; INTRAVENOUS; SUBCUTANEOUS
Status: DISCONTINUED | OUTPATIENT
Start: 2022-07-14 | End: 2022-07-15 | Stop reason: HOSPADM

## 2022-07-14 RX ORDER — PROPOFOL 10 MG/ML
INJECTION, EMULSION INTRAVENOUS CONTINUOUS PRN
Status: DISCONTINUED | OUTPATIENT
Start: 2022-07-14 | End: 2022-07-14

## 2022-07-14 RX ORDER — ONDANSETRON 2 MG/ML
4 INJECTION INTRAMUSCULAR; INTRAVENOUS
Status: DISCONTINUED | OUTPATIENT
Start: 2022-07-14 | End: 2022-07-14 | Stop reason: HOSPADM

## 2022-07-14 RX ORDER — PROCHLORPERAZINE MALEATE 10 MG
10 TABLET ORAL EVERY 6 HOURS PRN
Status: DISCONTINUED | OUTPATIENT
Start: 2022-07-14 | End: 2022-07-15 | Stop reason: HOSPADM

## 2022-07-14 RX ORDER — FLUMAZENIL 0.1 MG/ML
0.2 INJECTION, SOLUTION INTRAVENOUS
Status: DISCONTINUED | OUTPATIENT
Start: 2022-07-14 | End: 2022-07-15 | Stop reason: HOSPADM

## 2022-07-14 RX ORDER — LIDOCAINE 40 MG/G
CREAM TOPICAL
Status: DISCONTINUED | OUTPATIENT
Start: 2022-07-14 | End: 2022-07-14 | Stop reason: HOSPADM

## 2022-07-14 RX ORDER — ONDANSETRON 4 MG/1
4 TABLET, ORALLY DISINTEGRATING ORAL EVERY 6 HOURS PRN
Status: DISCONTINUED | OUTPATIENT
Start: 2022-07-14 | End: 2022-07-15 | Stop reason: HOSPADM

## 2022-07-14 RX ORDER — SODIUM CHLORIDE, SODIUM LACTATE, POTASSIUM CHLORIDE, CALCIUM CHLORIDE 600; 310; 30; 20 MG/100ML; MG/100ML; MG/100ML; MG/100ML
INJECTION, SOLUTION INTRAVENOUS CONTINUOUS
Status: DISCONTINUED | OUTPATIENT
Start: 2022-07-14 | End: 2022-07-14 | Stop reason: HOSPADM

## 2022-07-14 RX ADMIN — PROPOFOL 50 MG: 10 INJECTION, EMULSION INTRAVENOUS at 15:15

## 2022-07-14 RX ADMIN — LIDOCAINE HYDROCHLORIDE 40 MG: 20 INJECTION, SOLUTION INFILTRATION; PERINEURAL at 15:15

## 2022-07-14 RX ADMIN — LIDOCAINE HYDROCHLORIDE 60 MG: 20 INJECTION, SOLUTION INFILTRATION; PERINEURAL at 15:12

## 2022-07-14 RX ADMIN — PROPOFOL 300 MCG/KG/MIN: 10 INJECTION, EMULSION INTRAVENOUS at 15:13

## 2022-07-14 RX ADMIN — SODIUM CHLORIDE, SODIUM LACTATE, POTASSIUM CHLORIDE, CALCIUM CHLORIDE: 600; 310; 30; 20 INJECTION, SOLUTION INTRAVENOUS at 14:33

## 2022-07-14 RX ADMIN — PROPOFOL 50 MG: 10 INJECTION, EMULSION INTRAVENOUS at 15:17

## 2022-07-14 NOTE — H&P
"Addy DENIS Moreland  [id number]  male  27 year old      Reason for procedure/surgery: rectal bleeding, pain, diarrhea    Patient Active Problem List   Diagnosis     Pilonidal sinus       Past Surgical History:  History reviewed. No pertinent surgical history.    Past Medical History: History reviewed. No pertinent past medical history.    Social History:   Social History     Tobacco Use     Smoking status: Light Tobacco Smoker     Smokeless tobacco: Never Used   Substance Use Topics     Alcohol use: Yes     Comment: 1-2/day       Family History: History reviewed. No pertinent family history.    Allergies: No Known Allergies    Active Medications:   Current Outpatient Medications   Medication Sig Dispense Refill     IBUPROFEN PO          Systemic Review:   CONSTITUTIONAL: NEGATIVE for fever, chills, change in weight  ENT/MOUTH: NEGATIVE for ear, mouth and throat problems  RESP: NEGATIVE for significant cough or SOB  CV: NEGATIVE for chest pain, palpitations or peripheral edema    Physical Examination:   Vital Signs: BP (!) 147/82   Pulse 89   Temp 97.2  F (36.2  C) (Temporal)   Resp 18   Ht 1.778 m (5' 10\")   Wt 102.1 kg (225 lb)   SpO2 98%   BMI 32.28 kg/m    GENERAL: healthy, alert and no distress  NECK: no adenopathy, no asymmetry, masses, or scars  RESP: lungs clear to auscultation - no rales, rhonchi or wheezes  CV: regular rate and rhythm, normal S1 S2, no S3 or S4, no murmur, click or rub, no peripheral edema and peripheral pulses strong  ABDOMEN: soft, nontender, no hepatosplenomegaly, no masses and bowel sounds normal  MS: no gross musculoskeletal defects noted, no edema    Plan: Appropriate to proceed as scheduled.      Tamara Jo MD  7/14/2022    PCP:  Clinic, [facility]    "

## 2022-07-14 NOTE — ANESTHESIA PREPROCEDURE EVALUATION
Anesthesia Pre-Procedure Evaluation    Patient: Addy Moreland   MRN: [id number] : 1995        Procedure : Procedure(s):  COLONOSCOPY          No past medical history on file.   No past surgical history on file.   No Known Allergies   Social History     Tobacco Use     Smoking status: Never Smoker     Smokeless tobacco: Never Used   Substance Use Topics     Alcohol use: Yes      Wt Readings from Last 1 Encounters:   21 101.2 kg (223 lb 3.2 oz)           Physical Exam    Airway        Mallampati: I   TM distance: > 3 FB   Neck ROM: full   Mouth opening: > 3 cm    Respiratory Devices and Support         Dental  no notable dental history         Cardiovascular   cardiovascular exam normal          Pulmonary   pulmonary exam normal                OUTSIDE LABS:  CBC:   Lab Results   Component Value Date    WBC 8.3 06/10/2021    WBC 7.6 2006    HGB 16.6 06/10/2021    HGB 15.2 2006    HCT 49.1 06/10/2021    HCT 45.3 2006     06/10/2021     2006     BMP:   Lab Results   Component Value Date     2021     2006    POTASSIUM 4.1 2021    POTASSIUM 4.0 2006    CHLORIDE 102 2021    CHLORIDE 99 2006    CO2 29 2021    CO2 26 2006    BUN 15 2021    BUN 16 2006    CR 0.79 2021    CR 0.54 2006    GLC 77 2021    GLC 85 2006     COAGS: No results found for: PTT, INR, FIBR  POC:   Lab Results   Component Value Date    BGM 78 2006     HEPATIC:   Lab Results   Component Value Date    AST 30 2006     OTHER:   Lab Results   Component Value Date    ANOOP 9.3 2021       Anesthesia Plan    ASA Status:  2   NPO Status:  NPO Appropriate    Anesthesia Type: MAC.     - Reason for MAC: straight local not clinically adequate   Induction: Intravenous, Propofol.   Maintenance: Balanced.        Consents    Anesthesia Plan(s) and associated risks, benefits, and realistic alternatives  discussed. Questions answered and patient/representative(s) expressed understanding.    - Discussed:     - Discussed with:  Patient      - Extended Intubation/Ventilatory Support Discussed: No.      - Patient is DNR/DNI Status: No    Use of blood products discussed: No .     Postoperative Care       PONV prophylaxis: Ondansetron (or other 5HT-3), Background Propofol Infusion     Comments:                David Valerio MD

## 2022-07-14 NOTE — ANESTHESIA CARE TRANSFER NOTE
Patient: Addy Moreland    Procedure: Procedure(s):  COLONOSCOPY, WITH BIOPSIES       Diagnosis: Hematochezia [K92.1]  Diagnosis Additional Information: No value filed.    Anesthesia Type:   MAC     Note:    Oropharynx: oropharynx clear of all foreign objects and spontaneously breathing  Level of Consciousness: awake  Oxygen Supplementation: room air    Independent Airway: airway patency satisfactory and stable  Dentition: dentition unchanged  Vital Signs Stable: post-procedure vital signs reviewed and stable  Report to RN Given: handoff report given  Patient transferred to: Phase II    Handoff Report: Identifed the Patient, Identified the Reponsible Provider, Reviewed the pertinent medical history, Discussed the surgical course, Reviewed Intra-OP anesthesia mangement and issues during anesthesia, Set expectations for post-procedure period and Allowed opportunity for questions and acknowledgement of understanding      Vitals:  Vitals Value Taken Time   /76    Temp 36  C (96.8  F) 07/14/22 1546   Pulse 79 07/14/22 1546   Resp 16 07/14/22 1546   SpO2 97 % 07/14/22 1546       Electronically Signed By: ESAU Ribera CRNA  July 14, 2022  3:47 PM

## 2022-07-14 NOTE — INTERVAL H&P NOTE
"I have reviewed the surgical (or preoperative) H&P that is linked to this encounter, and examined the patient. There are no significant changes    Clinical Conditions Present on Arrival:  Clinically Significant Risk Factors Present on Admission                   # Obesity: Estimated body mass index is 32.28 kg/m  as calculated from the following:    Height as of this encounter: 1.778 m (5' 10\").    Weight as of this encounter: 102.1 kg (225 lb).       "

## 2022-07-14 NOTE — LETTER
"July 17, 2022      Addy Moreland  [address]  [facility] [address]        Dear ,    We are writing to inform you of your test results.    The biopsies of your colon all returned as normal - no evidence of inflammatory bowel disease (ulcerative colitis, crohn's disease) or microscopic colitis causing your intermittent diarrhea and rectal bleeding.     It has been a pleasure to participate in your care.  Please call our clinic if you have any questions or concerns.      Resulted Orders   Surgical Pathology Exam   Result Value Ref Range    Case Report       Surgical Pathology Report                         Case: [id number]                                  Authorizing Provider:  Tamara Jo MD          Collected:           07/14/2022 03:26 PM          Ordering Location:     [facility] OR  Received:            07/14/2022 03:47 PM                                 [place]                                                                  Pathologist:           James Vernon DO                                                            Specimen:    Other, random colon biopsies                                                               Final Diagnosis       A. COLON, RANDOM, BIOPSY:  - Unremarkable colonic mucosa  - No significant acute cryptitis, chronic changes, or microscopic colitis identified        Clinical Information       Chronic diarrhea      Gross Description       A(1). Other, random colon biopsies:  The specimen is received in formalin with proper patient identification, labeled \"random colon biopsies\".  The specimen consists of 4 pieces of pink-tan soft tissue ranging in size from 0.2 to 0.5 cm in greatest dimension, which are entirely submitted in cassette A1.         Microscopic Description       Microscopic examination has been performed.      A resident/fellow in an [employer]CGME accredited training program was involved in the initial review, preparation, and/or interpretation " of this case.  I, as the senior physician, attest that I: (i) reviewed patient clinical records if indicated; (ii) reviewed relevant lab test results; (iii) examined the relevant preparation(s) for the specimen(s); and (iv) agree with the report, diagnosis(es), and interpretation as documented by the resident/fellow and edited/confirmed by me. Reporting resident/fellow: Nerissa Costello PGY6      Performing Labs       The technical component of this testing was completed at [facility]      Case Images         If you have any questions or concerns, please call the clinic at the number listed above.       Sincerely,      Tamara Jo MD

## 2022-07-14 NOTE — ANESTHESIA POSTPROCEDURE EVALUATION
Patient: Addy Moreland    Procedure: Procedure(s):  COLONOSCOPY, WITH BIOPSIES       Anesthesia Type:  MAC    Note:  Disposition: Outpatient   Postop Pain Control: Uneventful            Sign Out: Well controlled pain   PONV:    Neuro/Psych: Uneventful            Sign Out: Acceptable/Baseline neuro status   Airway/Respiratory: Uneventful            Sign Out: Acceptable/Baseline resp. status   CV/Hemodynamics: Uneventful            Sign Out: Acceptable CV status; No obvious hypovolemia; No obvious fluid overload   Other NRE:    DID A NON-ROUTINE EVENT OCCUR?            Last vitals:  Vitals Value Taken Time   /68 07/14/22 1546   Temp 36  C (96.8  F) 07/14/22 1546   Pulse 79 07/14/22 1546   Resp 16 07/14/22 1546   SpO2 97 % 07/14/22 1546       Electronically Signed By: David Valerio MD  July 14, 2022  4:03 PM

## 2022-07-15 LAB
PATH REPORT.COMMENTS IMP SPEC: NORMAL
PATH REPORT.COMMENTS IMP SPEC: NORMAL
PATH REPORT.FINAL DX SPEC: NORMAL
PATH REPORT.GROSS SPEC: NORMAL
PATH REPORT.MICROSCOPIC SPEC OTHER STN: NORMAL
PATH REPORT.RELEVANT HX SPEC: NORMAL
PHOTO IMAGE: NORMAL

## 2022-08-27 ENCOUNTER — HEALTH MAINTENANCE LETTER (OUTPATIENT)
Age: 27
End: 2022-08-27

## 2022-08-29 ENCOUNTER — NURSE TRIAGE (OUTPATIENT)
Dept: NURSING | Facility: CLINIC | Age: 27
End: 2022-08-29

## 2022-08-29 ENCOUNTER — OFFICE VISIT (OUTPATIENT)
Dept: URGENT CARE | Facility: URGENT CARE | Age: 27
End: 2022-08-29

## 2022-08-29 ENCOUNTER — ANCILLARY PROCEDURE (OUTPATIENT)
Dept: GENERAL RADIOLOGY | Facility: CLINIC | Age: 27
End: 2022-08-29
Attending: FAMILY MEDICINE
Payer: COMMERCIAL

## 2022-08-29 VITALS
TEMPERATURE: 99 F | HEART RATE: 72 BPM | WEIGHT: 235 LBS | SYSTOLIC BLOOD PRESSURE: 153 MMHG | OXYGEN SATURATION: 97 % | DIASTOLIC BLOOD PRESSURE: 89 MMHG | BODY MASS INDEX: 33.72 KG/M2

## 2022-08-29 DIAGNOSIS — R07.9 CHEST PAIN, UNSPECIFIED TYPE: ICD-10-CM

## 2022-08-29 DIAGNOSIS — R07.9 CHEST PAIN, UNSPECIFIED TYPE: Primary | ICD-10-CM

## 2022-08-29 LAB
ALBUMIN SERPL-MCNC: 4.1 G/DL (ref 3.4–5)
ALBUMIN UR-MCNC: NEGATIVE MG/DL
ALP SERPL-CCNC: 67 U/L (ref 40–150)
ALT SERPL W P-5'-P-CCNC: 43 U/L (ref 0–70)
AMORPH CRY #/AREA URNS HPF: ABNORMAL /HPF
ANION GAP SERPL CALCULATED.3IONS-SCNC: 6 MMOL/L (ref 3–14)
APPEARANCE UR: ABNORMAL
AST SERPL W P-5'-P-CCNC: 23 U/L (ref 0–45)
BACTERIA #/AREA URNS HPF: ABNORMAL /HPF
BILIRUB SERPL-MCNC: 0.4 MG/DL (ref 0.2–1.3)
BILIRUB UR QL STRIP: NEGATIVE
BUN SERPL-MCNC: 14 MG/DL (ref 7–30)
CALCIUM SERPL-MCNC: 9.5 MG/DL (ref 8.5–10.1)
CHLORIDE BLD-SCNC: 108 MMOL/L (ref 94–109)
CO2 SERPL-SCNC: 26 MMOL/L (ref 20–32)
COLOR UR AUTO: YELLOW
CREAT SERPL-MCNC: 0.8 MG/DL (ref 0.66–1.25)
ERYTHROCYTE [DISTWIDTH] IN BLOOD BY AUTOMATED COUNT: 11.5 % (ref 10–15)
GFR SERPL CREATININE-BSD FRML MDRD: >90 ML/MIN/1.73M2
GLUCOSE BLD-MCNC: 91 MG/DL (ref 70–99)
GLUCOSE UR STRIP-MCNC: NEGATIVE MG/DL
HCT VFR BLD AUTO: 44.7 % (ref 40–53)
HGB BLD-MCNC: 15 G/DL (ref 13.3–17.7)
HGB UR QL STRIP: ABNORMAL
KETONES UR STRIP-MCNC: NEGATIVE MG/DL
LEUKOCYTE ESTERASE UR QL STRIP: NEGATIVE
LIPASE SERPL-CCNC: 112 U/L (ref 73–393)
MCH RBC QN AUTO: 30.4 PG (ref 26.5–33)
MCHC RBC AUTO-ENTMCNC: 33.6 G/DL (ref 31.5–36.5)
MCV RBC AUTO: 91 FL (ref 78–100)
NITRATE UR QL: NEGATIVE
PH UR STRIP: 7.5 [PH] (ref 5–7)
PLATELET # BLD AUTO: 158 10E3/UL (ref 150–450)
POTASSIUM BLD-SCNC: 4.4 MMOL/L (ref 3.4–5.3)
PROT SERPL-MCNC: 7.7 G/DL (ref 6.8–8.8)
RBC # BLD AUTO: 4.93 10E6/UL (ref 4.4–5.9)
RBC #/AREA URNS AUTO: ABNORMAL /HPF
SODIUM SERPL-SCNC: 140 MMOL/L (ref 133–144)
SP GR UR STRIP: 1.02 (ref 1–1.03)
TROPONIN I SERPL HS-MCNC: 5 NG/L
UROBILINOGEN UR STRIP-ACNC: 0.2 E.U./DL
WBC # BLD AUTO: 9.4 10E3/UL (ref 4–11)
WBC #/AREA URNS AUTO: ABNORMAL /HPF

## 2022-08-29 PROCEDURE — 80053 COMPREHEN METABOLIC PANEL: CPT | Performed by: FAMILY MEDICINE

## 2022-08-29 PROCEDURE — 81001 URINALYSIS AUTO W/SCOPE: CPT | Performed by: FAMILY MEDICINE

## 2022-08-29 PROCEDURE — 84484 ASSAY OF TROPONIN QUANT: CPT | Performed by: FAMILY MEDICINE

## 2022-08-29 PROCEDURE — 99214 OFFICE O/P EST MOD 30 MIN: CPT | Performed by: FAMILY MEDICINE

## 2022-08-29 PROCEDURE — 85027 COMPLETE CBC AUTOMATED: CPT | Performed by: FAMILY MEDICINE

## 2022-08-29 PROCEDURE — 36415 COLL VENOUS BLD VENIPUNCTURE: CPT | Performed by: FAMILY MEDICINE

## 2022-08-29 PROCEDURE — 93000 ELECTROCARDIOGRAM COMPLETE: CPT | Performed by: FAMILY MEDICINE

## 2022-08-29 PROCEDURE — 83690 ASSAY OF LIPASE: CPT | Performed by: FAMILY MEDICINE

## 2022-08-29 PROCEDURE — 71046 X-RAY EXAM CHEST 2 VIEWS: CPT | Mod: TC | Performed by: RADIOLOGY

## 2022-08-29 NOTE — TELEPHONE ENCOUNTER
RN calling patient to relay provider recommendation below.     Patient verbalized understanding and denies further questions at this time.     Cristina Reed RN  [facility]

## 2022-08-29 NOTE — PROGRESS NOTES
Assessment & Plan     Chest pain, unspecified type  Testing all normal. Possibly this is msk in origen such as with somatic dysfunction of a rib. Wonder still if a stress test may be idicated given the fm and personal of elevated blood pressure and cholesteorl. recommeded primary care follow up for next steps within a week. Sooner or to ER if worsneing  - XR Chest 2 Views  - EKG 12-lead complete w/read - Clinics  - CBC with platelets  - UA macro with reflex to Microscopic and Culture - Clinc Collect  - Comprehensive metabolic panel  - Lipase  - Troponin I  - Troponin I  - CBC with platelets  - Comprehensive metabolic panel  - Lipase  - Urine Microscopic             No follow-ups on file.    Vishal Horne MD  [facility]    Dinora Daly is a 27 year old male who presents to clinic today for the following health issues:  Chief Complaint   Patient presents with     Chest Pain     Pt having chest pain in lower left side of chest, also back pain low center, pt has been seeing chiropractor for 1 month, pain woke pt up out of his sleep yesterday, intense pain in back when he is laying down. Pt having stinging sensation in the left side of neck     HPI  Chest pain off and on for a couple of weeks worse as of late. Seems to occur more when after eating and with exercise and with twisting/laying on it. Seen by chirpractor without relief. Pain initally more seemed to be at the thoracic spine but then seemed to move more anterior. occ shortness of breath or pleuritic nature to the pain      Review of Systems  ? gerd symptoms on occasion. No other gi, gu, cv, resp, const symptoms.       Objective    BP (!) 153/89 (BP Location: Left arm, Patient Position: Sitting, Cuff Size: Adult Regular)   Pulse 72   Temp 99  F (37.2  C) (Tympanic)   Wt 106.6 kg (235 lb)   SpO2 97%   BMI 33.72 kg/m    Physical Exam   GENERAL: healthy, alert and no distress  EYES: Eyes grossly normal to  inspection  HENT: ear canals and TM's normal, nose and mouth without ulcers or lesions  NECK: no adenopathy, no asymmetry, masses, or scars and thyroid normal to palpation  RESP: lungs clear to auscultation - no rales, rhonchi or wheezes  CV: regular rate and rhythm, normal S1 S2, no S3 or S4, no murmur, click or rub,  ABDOMEN: soft, nontender, no hepatosplenomegaly, no masses and bowel sounds normal  MS: no gross musculoskeletal defects noted, no edema    ekg is normal.     Results for orders placed or performed in visit on 08/29/22   XR Chest 2 Views     Status: None    Narrative    XR CHEST 2 VIEWS  8/29/2022 2:20 PM       INDICATION: Chest pain  COMPARISON: None       Impression    IMPRESSION: Negative chest.    JUNIOR SMITH MD         SYSTEM ID:  [id number]   Results for orders placed or performed in visit on 08/29/22   UA macro with reflex to Microscopic and Culture - Clinc Collect     Status: Abnormal    Specimen: Urine, Clean Catch   Result Value Ref Range    Color Urine Yellow Colorless, Straw, Light Yellow, Yellow    Appearance Urine Cloudy (A) Clear    Glucose Urine Negative Negative mg/dL    Bilirubin Urine Negative Negative    Ketones Urine Negative Negative mg/dL    Specific Gravity Urine 1.020 1.003 - 1.035    Blood Urine Trace (A) Negative    pH Urine 7.5 (H) 5.0 - 7.0    Protein Albumin Urine Negative Negative mg/dL    Urobilinogen Urine 0.2 0.2, 1.0 E.U./dL    Nitrite Urine Negative Negative    Leukocyte Esterase Urine Negative Negative   Troponin I     Status: Normal   Result Value Ref Range    Troponin I High Sensitivity 5 <79 ng/L   CBC with platelets     Status: Normal   Result Value Ref Range    WBC Count 9.4 4.0 - 11.0 10e3/uL    RBC Count 4.93 4.40 - 5.90 10e6/uL    Hemoglobin 15.0 13.3 - 17.7 g/dL    Hematocrit 44.7 40.0 - 53.0 %    MCV 91 78 - 100 fL    MCH 30.4 26.5 - 33.0 pg    MCHC 33.6 31.5 - 36.5 g/dL    RDW 11.5 10.0 - 15.0 %    Platelet Count 158 150 - 450 10e3/uL   Comprehensive  metabolic panel     Status: Normal   Result Value Ref Range    Sodium 140 133 - 144 mmol/L    Potassium 4.4 3.4 - 5.3 mmol/L    Chloride 108 94 - 109 mmol/L    Carbon Dioxide (CO2) 26 20 - 32 mmol/L    Anion Gap 6 3 - 14 mmol/L    Urea Nitrogen 14 7 - 30 mg/dL    Creatinine 0.80 0.66 - 1.25 mg/dL    Calcium 9.5 8.5 - 10.1 mg/dL    Glucose 91 70 - 99 mg/dL    Alkaline Phosphatase 67 40 - 150 U/L    AST 23 0 - 45 U/L    ALT 43 0 - 70 U/L    Protein Total 7.7 6.8 - 8.8 g/dL    Albumin 4.1 3.4 - 5.0 g/dL    Bilirubin Total 0.4 0.2 - 1.3 mg/dL    GFR Estimate >90 >60 mL/min/1.73m2   Lipase     Status: Normal   Result Value Ref Range    Lipase 112 73 - 393 U/L   Urine Microscopic     Status: Abnormal   Result Value Ref Range    Bacteria Urine Many (A) None Seen /HPF    RBC Urine 0-2 0-2 /HPF /HPF    WBC Urine None Seen 0-5 /HPF /HPF    Amorphous Crystals Urine Many (A) None Seen /HPF    Narrative    Urine Culture not indicated

## 2022-08-29 NOTE — TELEPHONE ENCOUNTER
Nurse Triage SBAR    Is this a 2nd Level Triage? YES, LICENSED PRACTITIONER REVIEW IS REQUIRED    Situation:  Chest pain for the past week with back pain over the past month. Reports chest pain rated 3-5/10.     Background: Reports seeing a chiropractor for the back pain with chiropractor thinking back pain could be a spleen issue. Back pain can be tolerable but then intense at times. Chest pain is intermittent but can last longer than 5 minutes with pain rated 3-5/10. Reports the pain feels like it travels through the chest into the back. No sweating, difficulty breathing or history of cardiac issues.     Assessment:  Mild-moderate chest and back pain.     Protocol Recommended Disposition:   Go To ED/UCC Now (Or To Office With PCP Approval)    Recommendation: Requesting PCP input regarding next steps. Patient wants to be seen today at some locations, please advise.      Routed to provider    Does the patient meet one of the following criteria for ADS visit consideration? 16+ years old, with an [facility] PCP     TIP  Providers, please consider if this condition is appropriate for management at one of our Acute and Diagnostic Services sites.     If patient is a good candidate, please use dotphrase <dot>triageresponse and select Refer to ADS to document.      Reason for Disposition    Chest pain lasting longer than 5 minutes and occurred in last 3 days (72 hours) (Exception: feels exactly the same as previously diagnosed heartburn and has accompanying sour taste in mouth)    Additional Information    Negative: Shock suspected (e.g., cold/pale/clammy skin, too weak to stand, low BP, rapid pulse)    Negative: Difficult to awaken or acting confused (e.g., disoriented, slurred speech)    Negative: Passed out (i.e., fainted, collapsed and was not responding)    Negative: SEVERE difficulty breathing (e.g., struggling for each breath, speaks in single words)    Negative: Chest pain lasting longer than 5 minutes and ANY of the  following:* Over 44 years old* Over 30 years old and at least one cardiac risk factor (e.g., diabetes mellitus, high blood pressure, high cholesterol, smoker, or strong family history of heart disease)* History of heart disease (i.e., angina, heart attack, heart failure, bypass surgery, takes nitroglycerin)* Pain is crushing, pressure-like, or heavy    Negative: Heart beating < 50 beats per minute OR > 140 beats per minute    Negative: Visible sweat on face or sweat dripping down face    Negative: Sounds like a life-threatening emergency to the triager    Negative: Followed an injury to chest    Negative: SEVERE chest pain    Negative: Difficulty breathing    Negative: Pain also in shoulder(s) or arm(s) or jaw    Negative: Cocaine use within last 3 days    Negative: Major surgery in the past month    Negative: Hip or leg fracture (broken bone) in past month (or had cast on leg or ankle in past month)    Negative: Illness requiring prolonged bedrest in past month (e.g., immobilization, long hospital stay)    Negative: Long-distance travel in past month (e.g., car, bus, train, plane; with trip lasting 6 or more hours)    Negative: History of prior 'blood clot' in leg or lungs (i.e., deep vein thrombosis, pulmonary embolism)    Negative: History of inherited increased risk of blood clots (e.g., Factor 5 Leiden, Anti-thrombin 3, Protein C or Protein S deficiency, Prothrombin mutation)    Negative: Cancer treatment in the past two months (or has cancer now)    Negative: Heart beating irregularly or very rapidly    Protocols used: CHEST PAIN-A-OH

## 2022-08-29 NOTE — TELEPHONE ENCOUNTER
I have not seen this patient for a year  I recommend evaluation of his chest pain in the urgent care as the likelihood that this is cardiac is very low  It could be musculoskeletal in nature  However first he can go to the urgent care and take it from there  Provider Response to 2nd Level Triage Request    I have reviewed the RN documentation. My recommendation is:  Refer to Urgent Care

## 2022-09-21 ENCOUNTER — OFFICE VISIT (OUTPATIENT)
Dept: FAMILY MEDICINE | Facility: CLINIC | Age: 27
End: 2022-09-21
Payer: COMMERCIAL

## 2022-09-21 VITALS
HEIGHT: 70 IN | HEART RATE: 87 BPM | WEIGHT: 233 LBS | BODY MASS INDEX: 33.36 KG/M2 | SYSTOLIC BLOOD PRESSURE: 138 MMHG | DIASTOLIC BLOOD PRESSURE: 92 MMHG | OXYGEN SATURATION: 96 %

## 2022-09-21 DIAGNOSIS — R03.0 ELEVATED BLOOD PRESSURE READING WITHOUT DIAGNOSIS OF HYPERTENSION: ICD-10-CM

## 2022-09-21 DIAGNOSIS — R10.9 LEFT FLANK PAIN: Primary | ICD-10-CM

## 2022-09-21 DIAGNOSIS — F10.90 HEAVY ALCOHOL USE: ICD-10-CM

## 2022-09-21 LAB
ALBUMIN UR-MCNC: ABNORMAL MG/DL
AMORPH CRY #/AREA URNS HPF: ABNORMAL /HPF
APPEARANCE UR: ABNORMAL
BACTERIA #/AREA URNS HPF: ABNORMAL /HPF
BILIRUB UR QL STRIP: NEGATIVE
COLOR UR AUTO: YELLOW
GLUCOSE UR STRIP-MCNC: NEGATIVE MG/DL
HGB UR QL STRIP: ABNORMAL
KETONES UR STRIP-MCNC: NEGATIVE MG/DL
LEUKOCYTE ESTERASE UR QL STRIP: NEGATIVE
NITRATE UR QL: NEGATIVE
PH UR STRIP: 7.5 [PH] (ref 5–7)
RBC #/AREA URNS AUTO: ABNORMAL /HPF
SP GR UR STRIP: 1.02 (ref 1–1.03)
SQUAMOUS #/AREA URNS AUTO: ABNORMAL /LPF
UROBILINOGEN UR STRIP-ACNC: 0.2 E.U./DL
WBC #/AREA URNS AUTO: ABNORMAL /HPF

## 2022-09-21 PROCEDURE — 99214 OFFICE O/P EST MOD 30 MIN: CPT | Performed by: PHYSICIAN ASSISTANT

## 2022-09-21 PROCEDURE — 81001 URINALYSIS AUTO W/SCOPE: CPT | Performed by: PHYSICIAN ASSISTANT

## 2022-09-21 ASSESSMENT — PAIN SCALES - GENERAL: PAINLEVEL: MILD PAIN (3)

## 2022-09-21 NOTE — PATIENT INSTRUCTIONS
Watch your salt intake    Try to exercise at least 150 minutes per week    Please call to schedule your CT scan: [phone]    Please call to set up your 24 hour blood pressure monitor:  [phone]    Cut down on alcohol intake    Establish care with a primary care physician in 6-8 weeks

## 2022-09-21 NOTE — PROGRESS NOTES
"Assessment and Plan:     (R10.9) Left flank pain  (primary encounter diagnosis)  Comment: intermittent and ongoing x months, no sob, no fever/chills, had normal colonoscopy in July, denies history of kidney stones, after discussion re renal US vs CT scan to assess for stones, he elects CT scan  Plan: UA with Microscopic reflex to Culture - lab         collect, CT Abdomen Pelvis w/o Contrast, Urine         Microscopic  Will get CT scan to r/o stones    (Z78.9) Heavy alcohol use  Comment: two mixed drinks per day  Plan: try to cut down as I think this is contributing to his elevated BP    (R03.0) Elevated blood pressure reading without diagnosis of hypertension  Comment: had normal pressure in July, has gained some weight over the last year   Plan: 24 Hour Blood Pressure Monitor - Adult  Reduce salt intake  Get 150 minutes of cardio  Cut down on etoh as mentioned above  Est w/pcp in 6-8 weeks for follow-up      No Mclean PA-C  30 minutes on the day of the encounter doing chart review, history and exam, documentation and further activities as noted above.        Dinora Daly is a 27 year old presenting for the following health issues:  Follow Up ( Missy Champion 8/29/22 )      WES Daly is here for follow-up on  visit  He has had some LUQ pain that is intermittent  It sometimes radiates to his back  The pains started 4-5 months  He denies trauma  He does do a lot of heaving lifting at work, he works construction  He denies history of kidney stones  He denies dysuria, frequency or gross hematuria    The pain does not worsen w/movement, it actually feels better when he is active    He drinks two mixed drinks per night  He had a normal colonoscopy in July of this year    Review of Systems   See above      Objective    BP (!) 144/88 (BP Location: Right arm, Patient Position: Sitting, Cuff Size: Adult Large)   Pulse 87   Ht 1.778 m (5' 10\")   Wt 105.7 kg (233 lb)   SpO2 96%   BMI 33.43 kg/m  "   Body mass index is 33.43 kg/m .     Physical Exam     GENERAL: healthy, alert and no distress   RESP: lungs clear to auscultation - no rales, no rhonchi, no wheezes  CV: regular rates and rhythm, normal S1 S2, no S3 or S4 and no murmur, no click or rub  ABD: soft, minimal left flank tenderness, no guarding, no rebound tenderness, no masses, +BS  MS: extremities- no gross deformities noted, no edema  SKIN: no suspicious lesions, no rashes

## 2022-09-22 NOTE — RESULT ENCOUNTER NOTE
Dear Addy,     Here are your recent urine results which are negative for blood.  Please schedule your CT scan as we discussed to rule out kidney stone.    Please let us know if you have any questions or concerns.    Regards,  No Mclean PA-C

## 2022-09-26 ENCOUNTER — ANCILLARY PROCEDURE (OUTPATIENT)
Dept: CT IMAGING | Facility: CLINIC | Age: 27
End: 2022-09-26
Attending: PHYSICIAN ASSISTANT
Payer: COMMERCIAL

## 2022-09-26 DIAGNOSIS — R10.9 LEFT FLANK PAIN: ICD-10-CM

## 2022-09-26 PROCEDURE — 74176 CT ABD & PELVIS W/O CONTRAST: CPT

## 2022-09-27 NOTE — RESULT ENCOUNTER NOTE
Basia Dumas is out of the office today.  I have had the opportunity to review your recent results and an interpretation is as follows:  Your CT scan showed no concerning findings.  We can continue to monitor.  Please follow-up if your symptoms are improving    Sincerely,  Laci Gold MD

## 2023-01-07 ENCOUNTER — HEALTH MAINTENANCE LETTER (OUTPATIENT)
Age: 28
End: 2023-01-07

## 2023-09-23 ENCOUNTER — HEALTH MAINTENANCE LETTER (OUTPATIENT)
Age: 28
End: 2023-09-23

## 2024-11-10 ENCOUNTER — HEALTH MAINTENANCE LETTER (OUTPATIENT)
Age: 29
End: 2024-11-10

## (undated) DEVICE — KIT ENDO TURNOVER/PROCEDURE CARRY-ON 101822

## (undated) DEVICE — GOWN IMPERVIOUS 2XL BLUE

## (undated) DEVICE — SOL WATER IRRIG 500ML BOTTLE 2F7113

## (undated) DEVICE — ENDO FORCEP SPIKED SERRATED SHAFT JUMBO 239CM G56998

## (undated) DEVICE — SPECIMEN CONTAINER 3OZ W/FORMALIN 59901

## (undated) DEVICE — SNARE CAPIVATOR ROUND COLD SNR BX10 M00561101

## (undated) DEVICE — TUBING SUCTION 12"X1/4" N612

## (undated) DEVICE — SUCTION MANIFOLD NEPTUNE 2 SYS 1 PORT 702-025-000